# Patient Record
Sex: MALE | Race: WHITE | NOT HISPANIC OR LATINO
[De-identification: names, ages, dates, MRNs, and addresses within clinical notes are randomized per-mention and may not be internally consistent; named-entity substitution may affect disease eponyms.]

---

## 2024-08-08 ENCOUNTER — DASHBOARD ENCOUNTERS (OUTPATIENT)
Age: 64
End: 2024-08-08

## 2024-08-08 ENCOUNTER — LAB OUTSIDE AN ENCOUNTER (OUTPATIENT)
Dept: URBAN - NONMETROPOLITAN AREA CLINIC 4 | Facility: CLINIC | Age: 64
End: 2024-08-08

## 2024-08-08 ENCOUNTER — OFFICE VISIT (OUTPATIENT)
Dept: URBAN - NONMETROPOLITAN AREA CLINIC 4 | Facility: CLINIC | Age: 64
End: 2024-08-08
Payer: MEDICARE

## 2024-08-08 VITALS — TEMPERATURE: 98 F | WEIGHT: 170 LBS | HEIGHT: 69 IN | BODY MASS INDEX: 25.18 KG/M2

## 2024-08-08 DIAGNOSIS — R11.0 NAUSEA: ICD-10-CM

## 2024-08-08 DIAGNOSIS — Z85.038 HISTORY OF COLON CANCER: ICD-10-CM

## 2024-08-08 DIAGNOSIS — R19.7 DIARRHEA, UNSPECIFIED TYPE: ICD-10-CM

## 2024-08-08 DIAGNOSIS — R10.13 EPIGASTRIC PAIN: ICD-10-CM

## 2024-08-08 PROBLEM — 428305005: Status: ACTIVE | Noted: 2024-08-08

## 2024-08-08 PROBLEM — 429699009: Status: ACTIVE | Noted: 2024-08-08

## 2024-08-08 PROCEDURE — 99204 OFFICE O/P NEW MOD 45 MIN: CPT | Performed by: REGISTERED NURSE

## 2024-08-08 RX ORDER — FAMOTIDINE 20 MG/1
TAKE ONE TABLET BY MOUTH EVERY MORNING AND TAKE ONE TABLET BY MOUTH BEFORE BEDTIME TABLET ORAL
Qty: 40 UNSPECIFIED | Refills: 0 | Status: ACTIVE | COMMUNITY

## 2024-08-08 RX ORDER — OMEPRAZOLE 40 MG/1
1 CAPSULE 30 MINUTES BEFORE MEAL CAPSULE, DELAYED RELEASE ORAL TWICE DAILY
Qty: 30 | Refills: 1 | OUTPATIENT
Start: 2024-08-08

## 2024-08-08 RX ORDER — GABAPENTIN 100 MG/1
TAKE ONE CAPSULE BY MOUTH AT BEDTIME CAPSULE ORAL
Qty: 90 UNSPECIFIED | Refills: 0 | Status: ACTIVE | COMMUNITY

## 2024-08-08 RX ORDER — LISINOPRIL 10 MG/1
TAKE ONE TABLET BY MOUTH ONE TIME DAILY TABLET ORAL
Qty: 90 UNSPECIFIED | Refills: 0 | Status: ACTIVE | COMMUNITY

## 2024-08-08 RX ORDER — ESCITALOPRAM 10 MG/1
TAKE ONE TABLET BY MOUTH ONE TIME DAILY TABLET, FILM COATED ORAL
Qty: 90 UNSPECIFIED | Refills: 0 | Status: ACTIVE | COMMUNITY

## 2024-08-08 RX ORDER — ATORVASTATIN CALCIUM 80 MG/1
TAKE ONE TABLET BY MOUTH AT BEDTIME TABLET, FILM COATED ORAL
Qty: 90 UNSPECIFIED | Refills: 0 | Status: ACTIVE | COMMUNITY

## 2024-08-08 RX ORDER — CLOPIDOGREL BISULFATE 75 MG/1
TAKE ONE TABLET BY MOUTH ONE TIME DAILY AS DIRECTED TABLET, FILM COATED ORAL
Qty: 90 UNSPECIFIED | Refills: 0 | Status: ACTIVE | COMMUNITY

## 2024-08-08 NOTE — HPI-TODAY'S VISIT:
8/8/24: Pt is a 64 yo male with PMH of colon cancer s/p partial colectomy in 2007, CVA in 2023 (on Plavix), HTN, HLD, who was referred by Mireille York NP, for evaluation of epigastric pain, black stools and Hx of colon cancer.  A copy of this document will be sent to the referring provider.   Pt reports epigastric pain and diarrhea for past 5 weeks. Has occasional nausea and reflux. Denies vomiting, dysphagia, melena or unintentional weight loss. He admits to taking Pepto Bismol which turned his stools black. He has chronic loose stools since his partial colectomy in 2007, but is now having 12-15 BMs daily. Denies hematochezia. He went to UC initially, but did not have and labs or stool testing. Seen by PCP who prescribed famotidine with no improvement. Has had EGD several years ago. He has a Hx of colon cancer s/p partial colectomy in 2007. Father had colon cancer. He takes Plavix due to stroke. Follows cardiolist, Dr. Orourke.

## 2024-08-13 ENCOUNTER — P2P PATIENT RECORD (OUTPATIENT)
Age: 64
End: 2024-08-13

## 2024-09-06 ENCOUNTER — TELEPHONE ENCOUNTER (OUTPATIENT)
Dept: URBAN - NONMETROPOLITAN AREA CLINIC 4 | Facility: CLINIC | Age: 64
End: 2024-09-06

## 2024-11-14 ENCOUNTER — OFFICE VISIT (OUTPATIENT)
Dept: URBAN - METROPOLITAN AREA SURGERY CENTER 14 | Facility: SURGERY CENTER | Age: 64
End: 2024-11-14